# Patient Record
Sex: FEMALE | Race: BLACK OR AFRICAN AMERICAN | NOT HISPANIC OR LATINO | URBAN - METROPOLITAN AREA
[De-identification: names, ages, dates, MRNs, and addresses within clinical notes are randomized per-mention and may not be internally consistent; named-entity substitution may affect disease eponyms.]

---

## 2017-01-13 ENCOUNTER — INPATIENT (INPATIENT)
Facility: HOSPITAL | Age: 67
LOS: 0 days | Discharge: HOME | DRG: 310 | End: 2017-01-14
Attending: INTERNAL MEDICINE | Admitting: INTERNAL MEDICINE
Payer: COMMERCIAL

## 2017-01-13 ENCOUNTER — TRANSCRIPTION ENCOUNTER (OUTPATIENT)
Age: 67
End: 2017-01-13

## 2017-01-13 VITALS
TEMPERATURE: 99 F | DIASTOLIC BLOOD PRESSURE: 87 MMHG | HEART RATE: 73 BPM | OXYGEN SATURATION: 99 % | SYSTOLIC BLOOD PRESSURE: 170 MMHG | RESPIRATION RATE: 18 BRPM

## 2017-01-13 VITALS
OXYGEN SATURATION: 97 % | RESPIRATION RATE: 18 BRPM | DIASTOLIC BLOOD PRESSURE: 65 MMHG | TEMPERATURE: 98 F | SYSTOLIC BLOOD PRESSURE: 108 MMHG | HEART RATE: 60 BPM

## 2017-01-13 DIAGNOSIS — H43.399 OTHER VITREOUS OPACITIES, UNSPECIFIED EYE: ICD-10-CM

## 2017-01-13 DIAGNOSIS — I10 ESSENTIAL (PRIMARY) HYPERTENSION: ICD-10-CM

## 2017-01-13 DIAGNOSIS — R63.4 ABNORMAL WEIGHT LOSS: ICD-10-CM

## 2017-01-13 DIAGNOSIS — R00.2 PALPITATIONS: ICD-10-CM

## 2017-01-13 DIAGNOSIS — Z98.891 HISTORY OF UTERINE SCAR FROM PREVIOUS SURGERY: Chronic | ICD-10-CM

## 2017-01-13 DIAGNOSIS — Z90.710 ACQUIRED ABSENCE OF BOTH CERVIX AND UTERUS: Chronic | ICD-10-CM

## 2017-01-13 LAB
ALBUMIN SERPL ELPH-MCNC: 3.9 G/DL — SIGNIFICANT CHANGE UP (ref 3.3–5)
ALP SERPL-CCNC: 75 U/L — SIGNIFICANT CHANGE UP (ref 40–120)
ALT FLD-CCNC: 32 U/L RC — SIGNIFICANT CHANGE UP (ref 10–45)
ANION GAP SERPL CALC-SCNC: 13 MMOL/L — SIGNIFICANT CHANGE UP (ref 5–17)
AST SERPL-CCNC: 40 U/L — SIGNIFICANT CHANGE UP (ref 10–40)
BASOPHILS # BLD AUTO: 0.1 K/UL — SIGNIFICANT CHANGE UP (ref 0–0.2)
BASOPHILS NFR BLD AUTO: 1.1 % — SIGNIFICANT CHANGE UP (ref 0–2)
BILIRUB SERPL-MCNC: 0.1 MG/DL — LOW (ref 0.2–1.2)
BUN SERPL-MCNC: 10 MG/DL — SIGNIFICANT CHANGE UP (ref 7–23)
CALCIUM SERPL-MCNC: 9.8 MG/DL — SIGNIFICANT CHANGE UP (ref 8.4–10.5)
CHLORIDE SERPL-SCNC: 104 MMOL/L — SIGNIFICANT CHANGE UP (ref 96–108)
CK MB BLD-MCNC: 1 % — SIGNIFICANT CHANGE UP (ref 0–3.5)
CK MB CFR SERPL CALC: 2.7 NG/ML — SIGNIFICANT CHANGE UP (ref 0–3.8)
CK SERPL-CCNC: 262 U/L — HIGH (ref 25–170)
CO2 SERPL-SCNC: 23 MMOL/L — SIGNIFICANT CHANGE UP (ref 22–31)
CREAT SERPL-MCNC: 0.91 MG/DL — SIGNIFICANT CHANGE UP (ref 0.5–1.3)
EOSINOPHIL # BLD AUTO: 0.1 K/UL — SIGNIFICANT CHANGE UP (ref 0–0.5)
EOSINOPHIL NFR BLD AUTO: 2.8 % — SIGNIFICANT CHANGE UP (ref 0–6)
GLUCOSE SERPL-MCNC: 97 MG/DL — SIGNIFICANT CHANGE UP (ref 70–99)
HCT VFR BLD CALC: 37.6 % — SIGNIFICANT CHANGE UP (ref 34.5–45)
HGB BLD-MCNC: 12.9 G/DL — SIGNIFICANT CHANGE UP (ref 11.5–15.5)
LYMPHOCYTES # BLD AUTO: 1.1 K/UL — SIGNIFICANT CHANGE UP (ref 1–3.3)
LYMPHOCYTES # BLD AUTO: 23.9 % — SIGNIFICANT CHANGE UP (ref 13–44)
MAGNESIUM SERPL-MCNC: 2.3 MG/DL — SIGNIFICANT CHANGE UP (ref 1.6–2.6)
MCHC RBC-ENTMCNC: 34 PG — SIGNIFICANT CHANGE UP (ref 27–34)
MCHC RBC-ENTMCNC: 34.3 GM/DL — SIGNIFICANT CHANGE UP (ref 32–36)
MCV RBC AUTO: 99.1 FL — SIGNIFICANT CHANGE UP (ref 80–100)
MONOCYTES # BLD AUTO: 0.3 K/UL — SIGNIFICANT CHANGE UP (ref 0–0.9)
MONOCYTES NFR BLD AUTO: 6.1 % — SIGNIFICANT CHANGE UP (ref 2–14)
NEUTROPHILS # BLD AUTO: 3.1 K/UL — SIGNIFICANT CHANGE UP (ref 1.8–7.4)
NEUTROPHILS NFR BLD AUTO: 66.2 % — SIGNIFICANT CHANGE UP (ref 43–77)
PHOSPHATE SERPL-MCNC: 3.6 MG/DL — SIGNIFICANT CHANGE UP (ref 2.5–4.5)
PLATELET # BLD AUTO: 187 K/UL — SIGNIFICANT CHANGE UP (ref 150–400)
POTASSIUM SERPL-MCNC: 4.9 MMOL/L — SIGNIFICANT CHANGE UP (ref 3.5–5.3)
POTASSIUM SERPL-SCNC: 4.9 MMOL/L — SIGNIFICANT CHANGE UP (ref 3.5–5.3)
PROT SERPL-MCNC: 7.3 G/DL — SIGNIFICANT CHANGE UP (ref 6–8.3)
RBC # BLD: 3.79 M/UL — LOW (ref 3.8–5.2)
RBC # FLD: 11.3 % — SIGNIFICANT CHANGE UP (ref 10.3–14.5)
SODIUM SERPL-SCNC: 140 MMOL/L — SIGNIFICANT CHANGE UP (ref 135–145)
TROPONIN T SERPL-MCNC: <0.01 NG/ML — SIGNIFICANT CHANGE UP (ref 0–0.06)
TROPONIN T SERPL-MCNC: <0.01 NG/ML — SIGNIFICANT CHANGE UP (ref 0–0.06)
TSH SERPL-MCNC: 0.3 UIU/ML — SIGNIFICANT CHANGE UP (ref 0.27–4.2)
WBC # BLD: 4.7 K/UL — SIGNIFICANT CHANGE UP (ref 3.8–10.5)
WBC # FLD AUTO: 4.7 K/UL — SIGNIFICANT CHANGE UP (ref 3.8–10.5)

## 2017-01-13 PROCEDURE — 99285 EMERGENCY DEPT VISIT HI MDM: CPT | Mod: 25

## 2017-01-13 PROCEDURE — 84484 ASSAY OF TROPONIN QUANT: CPT

## 2017-01-13 PROCEDURE — 78452 HT MUSCLE IMAGE SPECT MULT: CPT | Mod: 26

## 2017-01-13 PROCEDURE — A9500: CPT

## 2017-01-13 PROCEDURE — 82553 CREATINE MB FRACTION: CPT

## 2017-01-13 PROCEDURE — 93005 ELECTROCARDIOGRAM TRACING: CPT

## 2017-01-13 PROCEDURE — 80053 COMPREHEN METABOLIC PANEL: CPT

## 2017-01-13 PROCEDURE — 93010 ELECTROCARDIOGRAM REPORT: CPT

## 2017-01-13 PROCEDURE — 93016 CV STRESS TEST SUPVJ ONLY: CPT

## 2017-01-13 PROCEDURE — 78452 HT MUSCLE IMAGE SPECT MULT: CPT

## 2017-01-13 PROCEDURE — 85027 COMPLETE CBC AUTOMATED: CPT

## 2017-01-13 PROCEDURE — 99223 1ST HOSP IP/OBS HIGH 75: CPT

## 2017-01-13 PROCEDURE — 71046 X-RAY EXAM CHEST 2 VIEWS: CPT

## 2017-01-13 PROCEDURE — 93017 CV STRESS TEST TRACING ONLY: CPT

## 2017-01-13 PROCEDURE — 99236 HOSP IP/OBS SAME DATE HI 85: CPT

## 2017-01-13 PROCEDURE — 71020: CPT | Mod: 26

## 2017-01-13 PROCEDURE — 93018 CV STRESS TEST I&R ONLY: CPT

## 2017-01-13 PROCEDURE — 84443 ASSAY THYROID STIM HORMONE: CPT

## 2017-01-13 PROCEDURE — 84100 ASSAY OF PHOSPHORUS: CPT

## 2017-01-13 PROCEDURE — 82550 ASSAY OF CK (CPK): CPT

## 2017-01-13 PROCEDURE — 83735 ASSAY OF MAGNESIUM: CPT

## 2017-01-13 RX ORDER — FERROUS SULFATE 325(65) MG
1 TABLET ORAL
Qty: 0 | Refills: 0 | COMMUNITY

## 2017-01-13 RX ORDER — ASCORBIC ACID 60 MG
1 TABLET,CHEWABLE ORAL
Qty: 0 | Refills: 0 | COMMUNITY

## 2017-01-13 RX ORDER — ASPIRIN/CALCIUM CARB/MAGNESIUM 324 MG
162 TABLET ORAL ONCE
Qty: 0 | Refills: 0 | Status: COMPLETED | OUTPATIENT
Start: 2017-01-13 | End: 2017-01-13

## 2017-01-13 RX ORDER — ATENOLOL 25 MG/1
100 TABLET ORAL DAILY
Qty: 0 | Refills: 0 | Status: DISCONTINUED | OUTPATIENT
Start: 2017-01-13 | End: 2017-01-14

## 2017-01-13 RX ORDER — HEPARIN SODIUM 5000 [USP'U]/ML
5000 INJECTION INTRAVENOUS; SUBCUTANEOUS EVERY 8 HOURS
Qty: 0 | Refills: 0 | Status: DISCONTINUED | OUTPATIENT
Start: 2017-01-13 | End: 2017-01-14

## 2017-01-13 RX ORDER — ATENOLOL 25 MG/1
1 TABLET ORAL
Qty: 0 | Refills: 0 | COMMUNITY

## 2017-01-13 RX ORDER — ATENOLOL 25 MG/1
0 TABLET ORAL
Qty: 0 | Refills: 0 | COMMUNITY

## 2017-01-13 RX ORDER — L.ACIDOPH/B.ANIMALIS/B.LONGUM 15B CELL
1 CAPSULE ORAL
Qty: 0 | Refills: 0 | COMMUNITY

## 2017-01-13 RX ORDER — PREGABALIN 225 MG/1
1 CAPSULE ORAL
Qty: 0 | Refills: 0 | COMMUNITY

## 2017-01-13 RX ADMIN — Medication 162 MILLIGRAM(S): at 05:46

## 2017-01-13 NOTE — DISCHARGE NOTE ADULT - CARE PLAN
Principal Discharge DX:	Palpitations  Goal:	no palpitations  Instructions for follow-up, activity and diet:	Call Dr Sweet, cardiologist for follow up appointment for 1 week, outpatient echo  return to emergency for chest pain, difficulty breathing, dizziness  Secondary Diagnosis:	Essential hypertension  Goal:	/80  Instructions for follow-up, activity and diet:	Continue Atenolol

## 2017-01-13 NOTE — DISCHARGE NOTE ADULT - MEDICATION SUMMARY - MEDICATIONS TO TAKE
I will START or STAY ON the medications listed below when I get home from the hospital:    atenolol 100 mg oral tablet  -- 1 tab(s) by mouth once a day  -- verified with [facility] pharmacy  -- Indication: For HTN (hypertension)

## 2017-01-13 NOTE — DISCHARGE NOTE ADULT - PLAN OF CARE
no palpitations Call Dr Sweet, cardiologist for follow up appointment for 1 week, outpatient echo  return to emergency for chest pain, difficulty breathing, dizziness /80 Continue Atenolol

## 2017-01-13 NOTE — ED PROVIDER NOTE - CONSTITUTIONAL NEGATIVE STATEMENT, MLM
No fever, no chills, no chest pain, no shortness of breath, no abdominal pain, no vomiting, no loss of consciousness. ~ Orlando Barreto MD No fever, no chills, + chest pain, no shortness of breath, no abdominal pain, no vomiting, no loss of consciousness. ~ Orlando Barreto MD

## 2017-01-13 NOTE — ED ADULT NURSE NOTE - OBJECTIVE STATEMENT
67 yo female presents to the ED from work c/o palpitations today. patient states she first saw shadow/ dots at work but denies dizziness. patient also has generalized abdominal pain today. patient is AAOx4. lung sounds clear bilaterally. cap refill <3sec. abdomen is soft, non-tender, non- distended. last BM was today, normal as per patient. patient denies fever, chills, N/V/D, cough, HA, chest pain, SOB, dizziness. VSS. MD notified. will continue to monitor.

## 2017-01-13 NOTE — ED PROVIDER NOTE - PHYSICAL EXAMINATION
Physical Exam: elderly F who is in NAD, AAOx3, MMM, PERRLA, CTAB, normal rate and regular rhythm, abdomen is soft and NTND, No edema, No deformity of extremities, No rashes, CN grossly intact, No focal motor or sensory deficits. ~ Orlando Barreto MD

## 2017-01-13 NOTE — H&P ADULT. - PROBLEM SELECTOR PLAN 1
- patient seen by cardiology(Dr. Sweet) and Nuclear stress test ordered   - monitor on tele for 24 hours  - repeat CE  - check TSH  - if cardiac work up negative, to consider psych evaluation inpatient vs outpatient

## 2017-01-13 NOTE — H&P ADULT. - HISTORY OF PRESENT ILLNESS
66F with hx of HTN presents today with complaints of palpitation. Patient reports for the past couple of weeks, she has been experiencing intermittent episodes of "pinprick" sensation in body followed by "floaters" in eyes. Today while at work, patient had abdominal pain, bodyache and palpitation which prompted her to come to ED. Patient endorses that these episodes are brought on by certain "objects" but she couldn't identify specific triggers. Denies HA, blurry vision, cough, SOB, chest pain on exertion, changes in bowel habit, n/v, urinary symptoms. Patient currently denies any symptoms but endorses + unintentional weight loss of around 28 lbs over years. Last colonoscopy within 10 years was reportedly normal. Last stress test over 10 years ago(done as preop prior to hysterectomy) was also reportedly normal.

## 2017-01-13 NOTE — ED PROVIDER NOTE - MEDICAL DECISION MAKING DETAILS
palpitations w/ visual scotomas and pin-like sensation in body, will get labs w/ EKG, and CXR, place on telemetry.

## 2017-01-13 NOTE — ED PROVIDER NOTE - OBJECTIVE STATEMENT
Palpitations that started today around 11 pm when pt got to work, not associated w/ exertion. Reports that there is elevated HR, jitteriness. But denies CP, SOB, lightheadedness. Reports intermittent black spots in the vision that has been present for several days, associated also with pin-prick sensations in all of the body. No Hx of MI, cardiac issues. Denies syncope. Palpitations that started today around 11 pm when pt got to work, not associated w/ exertion. Reports that there is elevated HR, jitteriness. Reports chest discomfort, but denies SOB, lightheadedness. Reports intermittent black spots in the vision that has been present for several days, associated also with pin-prick sensations in all of the body. No Hx of MI, cardiac issues. Denies syncope.

## 2017-01-13 NOTE — H&P ADULT. - ASSESSMENT
66F with HTN presents today with episode of ab pain, bodyache, and palpitation. Suspect symptoms may be due to psych etiology(i.e. ? anxiety vs psychosomatic related symptoms)  and less likely cardiac etiology given that the patient associates her symptoms to certain objects, though, she couldn't identify specific triggers at this time.

## 2017-01-13 NOTE — DISCHARGE NOTE ADULT - CARE PROVIDER_API CALL
Jairon Sweet (MD; PhD), Cardiology; Internal Medicine; Vascular Medicine  [address] [address] [place] [address]  Phone: [phone]  Fax: [phone]

## 2017-01-13 NOTE — H&P ADULT. - PROBLEM SELECTOR PLAN 3
- check TSH, AM cortisol  - patient should follow up with PMD for further evaluation and age appropriate cancer screening.

## 2017-01-13 NOTE — DISCHARGE NOTE ADULT - OTHER SIGNIFICANT FINDINGS
IMPRESSIONS:Normal Study  * Exercise capacity: 10 METS, Excellent for age and  gender.  * Chest Pain: No chest pain with exercise.  * Symptom: No Symptom.  * HR Response: Appropriate.  * BP Response: Appropriate.  * Heart Rhythm: Sinus Bradycardia - 56 BPM.  * Q Waves: Cannot Rule Out anteroseptal MI.  * Baseline ECG: T wave abnormality in V5, V6.  * ECG Changes: ST Depression: 2 mm horizontal in leads V4,  V5, V6 started at 04:39 min of exercise at HR of 123 and  persisted 1:00 min into recovery.  * Arrhythmia: None.  * The left ventricle was normal in size. Normal myocardial  perfusion scan, with no evidence of infarction or  inducible ischemia.  * Post-stress gated wall motion analysis was performed  (LVEF > 70%;LVEDV = 65 ml.)  * No previous Nuclear/Stress exam.  ------------------------------------------------------------------------  Confirmed on  1/13/2017 - 16:48:33 by Mamadou Davila M.D.

## 2017-01-13 NOTE — DISCHARGE NOTE ADULT - CARE PROVIDERS DIRECT ADDRESSES
[email or web address]

## 2017-01-13 NOTE — DISCHARGE NOTE ADULT - PATIENT PORTAL LINK FT
“You can access the [facility] Patient Portal, offered by [facility], by registering with the following website: [email or web address]”

## 2017-01-13 NOTE — ED PROVIDER NOTE - ATTENDING CONTRIBUTION TO CARE
I have examined and evaluated this patient with the above resident or PA, and agree with the documented clinical history, exam and plan.   Briefly: Pt presenting with palpitations chest pain, concerning for ACS - will give aspirin, and obtain ECG, CXR, and labs (CBC/CMP/Cardiac Enzymes). If 1st enzyme negative, will admit to tele/cardiology (unattached) for r/o ACS.

## 2017-01-13 NOTE — DISCHARGE NOTE ADULT - MEDICATION SUMMARY - MEDICATIONS TO STOP TAKING
I will STOP taking the medications listed below when I get home from the hospital:    multivitamin  -- 1 tab(s) by mouth once a day    Vitamin C  -- 1 tab(s) by mouth once a day    ferrous sulfate  -- 1 tab(s) by mouth once a day    Vitamin B-12  -- 1 tab(s) by mouth once a day    B Complex  -- 1 tab(s) by mouth once a day    Calcium Mag & D  -- 1 tab(s) by mouth once a day    Probiotic Formula oral capsule  -- 1 cap(s) by mouth once a day

## 2017-01-25 ENCOUNTER — RECORD ABSTRACTING (OUTPATIENT)
Age: 67
End: 2017-01-25

## 2017-01-25 DIAGNOSIS — R00.2 PALPITATIONS: ICD-10-CM

## 2017-01-25 DIAGNOSIS — I10 ESSENTIAL (PRIMARY) HYPERTENSION: ICD-10-CM

## 2017-01-25 RX ORDER — ATENOLOL 100 MG/1
100 TABLET ORAL DAILY
Refills: 0 | Status: ACTIVE | COMMUNITY

## 2017-02-09 ENCOUNTER — APPOINTMENT (OUTPATIENT)
Dept: CARDIOLOGY | Facility: CLINIC | Age: 67
End: 2017-02-09

## 2017-02-20 PROBLEM — N39.0 URINARY TRACT INFECTION, SITE NOT SPECIFIED: Chronic | Status: ACTIVE | Noted: 2017-01-13

## 2017-02-20 PROBLEM — I10 ESSENTIAL (PRIMARY) HYPERTENSION: Chronic | Status: ACTIVE | Noted: 2017-01-13

## 2017-07-26 ENCOUNTER — OUTPATIENT (OUTPATIENT)
Dept: OUTPATIENT SERVICES | Facility: HOSPITAL | Age: 67
LOS: 1 days | Discharge: STILL ADMITTED OR ADMINISTRATIVE | End: 2017-07-26

## 2017-07-26 DIAGNOSIS — Z90.710 ACQUIRED ABSENCE OF BOTH CERVIX AND UTERUS: Chronic | ICD-10-CM

## 2017-07-26 DIAGNOSIS — Z98.891 HISTORY OF UTERINE SCAR FROM PREVIOUS SURGERY: Chronic | ICD-10-CM

## 2017-07-27 DIAGNOSIS — F22 DELUSIONAL DISORDERS: ICD-10-CM

## 2017-09-11 ENCOUNTER — APPOINTMENT (OUTPATIENT)
Dept: CT IMAGING | Facility: CLINIC | Age: 67
End: 2017-09-11
Payer: COMMERCIAL

## 2017-09-11 ENCOUNTER — OUTPATIENT (OUTPATIENT)
Dept: OUTPATIENT SERVICES | Facility: HOSPITAL | Age: 67
LOS: 1 days | End: 2017-09-11
Payer: COMMERCIAL

## 2017-09-11 ENCOUNTER — APPOINTMENT (OUTPATIENT)
Dept: MRI IMAGING | Facility: CLINIC | Age: 67
End: 2017-09-11
Payer: COMMERCIAL

## 2017-09-11 DIAGNOSIS — Z00.8 ENCOUNTER FOR OTHER GENERAL EXAMINATION: ICD-10-CM

## 2017-09-11 DIAGNOSIS — Z98.891 HISTORY OF UTERINE SCAR FROM PREVIOUS SURGERY: Chronic | ICD-10-CM

## 2017-09-11 DIAGNOSIS — Z90.710 ACQUIRED ABSENCE OF BOTH CERVIX AND UTERUS: Chronic | ICD-10-CM

## 2017-09-11 PROCEDURE — 71250 CT THORAX DX C-: CPT | Mod: 26

## 2017-09-11 PROCEDURE — 70551 MRI BRAIN STEM W/O DYE: CPT | Mod: 26

## 2017-09-11 PROCEDURE — 70551 MRI BRAIN STEM W/O DYE: CPT

## 2017-09-11 PROCEDURE — 71250 CT THORAX DX C-: CPT
